# Patient Record
Sex: MALE | Race: WHITE
[De-identification: names, ages, dates, MRNs, and addresses within clinical notes are randomized per-mention and may not be internally consistent; named-entity substitution may affect disease eponyms.]

---

## 2018-02-08 ENCOUNTER — HOSPITAL ENCOUNTER (OUTPATIENT)
Dept: HOSPITAL 17 - HSDC | Age: 38
Discharge: HOME | End: 2018-02-08
Attending: SURGERY
Payer: COMMERCIAL

## 2018-02-08 VITALS
DIASTOLIC BLOOD PRESSURE: 94 MMHG | TEMPERATURE: 97.9 F | OXYGEN SATURATION: 97 % | RESPIRATION RATE: 18 BRPM | HEART RATE: 86 BPM | SYSTOLIC BLOOD PRESSURE: 145 MMHG

## 2018-02-08 VITALS — HEIGHT: 72 IN | WEIGHT: 208.78 LBS | BODY MASS INDEX: 28.28 KG/M2

## 2018-02-08 DIAGNOSIS — K44.9: Primary | ICD-10-CM

## 2018-02-08 DIAGNOSIS — K21.9: ICD-10-CM

## 2018-02-08 PROCEDURE — 00790 ANES IPER UPR ABD NOS: CPT

## 2018-02-08 PROCEDURE — 43281 LAP PARAESOPHAG HERN REPAIR: CPT

## 2018-02-08 NOTE — PD.OP
__________________________________________________





Operative Report


Date of Surgery:  Feb 8, 2018


Preoperative Diagnosis:  


hiatal hernia, GERD


Postoperative Diagnosis:  


same


Procedure:


lap repair HH with Nissen Fundoplication


Anesthesia:


general


Surgeon:


Americo Espinoza


Assistant(s):


Gavino Lopez, MS3


Operation and Findings:


3cm  hiatal hernia.


Esophageal diameter too large for biggest size Linx.


Fundoplication over 48 [place] bougie.


EBL less than 5 ml.











Americo Espinoza MD Feb 8, 2018 10:18

## 2018-02-10 NOTE — MP
cc:

HEIDI GERONIMO M.D., KETUL R. MD FREDETTE-HUFFMAN, PATRICIA

****

 

 

DATE OF SURGERY: 02/10/2018.

 

PREOPERATIVE DIAGNOSIS:

1. Hiatal hernia.

2. Gastroesophageal reflux disease.

 

POSTOPERATIVE DIAGNOSIS

1. Hiatal hernia.

2. Gastroesophageal reflux disease.

 

OPERATIVE PROCEDURE PERFORMED:

Laparoscopic repair hiatal hernia with Nissen fundoplication.

 

SURGEON:

Dr. Heidi Geronimo

 

ASSISTANT SURGEON:

Dr. Gavino Villalobos

 

SECOND ASSISTANT:

Cris Lopez, MS III.

 

ANESTHESIA:

General.

 

INDICATIONS FOR THE PROCEDURE:

Very pleasant 37-year-old gentleman who has suffered from severe

gastroesophageal reflux disease with esophagitis.  He has been on proton pump

inhibitors and would like to come off.  He is interested in placement of the

LINX device with repair of hiatal hernia.

 

INTRAOPERATIVE FINDINGS:

About a 3-cm hiatal hernia defect.  Esophageal diameter too large for the

biggest size LINX. Procedure converted from LINX to a Nissen fundoplication

which was performed over a 48 [place] Bougie.

 

ESTIMATED BLOOD LOSS:

Less than 5 mL.

 

DESCRIPTION OF THE PROCEDURE IN DETAIL:

The patient was identified as Heidi Pittman, taken to the operating room and

placed in the supine position.  Sequential compression devices were placed on

bilateral lower extremities.

Following induction of adequate general endotracheal anesthesia, the patient's

abdomen was prepped and draped in the usual sterile fashion with Chloraprep.  A

time-out procedure was performed.  Following completion of the time-out

procedure everyone's satisfaction within the room, local anesthetic was placed

at each incision site. An incision vertical about 3 cm in length was carried

out in the supraumbilical midline and dissection continued posteriorly to the

level of the midline fascia. The fascia was incised with a scalpel and entry

into the peritoneal cavity was facilitated with the surgeon's finger.  The

[employer] balloon Chinchilla trocar was placed in the peritoneal cavity its

balloon inflated to C02 insufflation to a level of 15 mmHg ensued.  Four upper

abdominal 5 mm trocars were placed into the peritoneal cavity under direct

laparoscopic view after incision in the skin with a scalpel.  The Tamara-Flex

liver retractor was placed beneath the left lateral lobe of the liver and held

anteriorly using the retractor with a robot arm. The skin was padded with a

laparotomy pad beneath the trocar with the Tamara Flex liver retractor.

 

Attention was turned first to taking down the gastrohepatic ligament. A window

was created using the harmonic scalpel. The harmonic scalpel allowed for

dissection of fatty tissue from the hiatal hernia defect reducing it down into

the peritoneal cavity.  Esophageal length was increased by releasing

attachments in the mediastinum to the esophagus to allow the GE junction to be

safely below the diaphragmatic crura. A posterior window was developed using

the Harmonic scalpel.  The posterior vagus nerve was identified and a window

between the posterior vagus nerve and the esophagus was created using a

[place] dissector.  The 1/4 inch Penrose drain was placed through this window.

The LINX sizing device was then placed through this window and at the largest

sized LINX measurement indentation into the esophagus occurred.  Further

mobilization of the esophagus was performed. The anterior vagus nerve was

identified and a window was created behind the anterior vagus nerve.  The

sizing device was then placed between the anterior and posterior vagus nerves

and the esophagus and sizing occurred again.  Again, the largest size LINX at

17 created an indentation in the esophagus making placement of a LINX

contraindicated.

 

The sizing device was removed and conversion of the procedure to a Nissen

fundoplication was determined to be the right thing to do for the patient.  The

fundus of the stomach was then mobilized dividing the short gastric vessels and

the fundus was brought through the posterior window above the gastroesophageal

junction.  Sequential placement of esophageal Bougies was then performed by the

nurse anesthetist placing a 36, 40, 44 and then 48 [place] Bougies.  Attempts to

place 50 and 52 [place] Bougies were unsuccessful as the nurse anesthetist could

not maneuver it from the mouth into the esophagus and therefore a 48 [place]

Bougie was replaced and left in position. A 360 degree fundoplication was then

performed using #0 Ethibond sutures and the suture assistant device and the

upper two sutures taking full thickness stomach on either side of partial

thickness anterior esophagus. The third suture placed was full thickness

stomach to full thickness stomach. The Bougie was then removed without

difficulty. A single suture was placed in the diaphragmatic crura performing a

modified gastropexy in this location taking the posterior fundus of the stomach

and suturing it to the diaphragmatic crura and closing the small hiatal hernia

defect.

 

Photographs were taken of the completed repair. Remaining local anesthetic was

placed in the subdiaphragmatic position.  The liver retractor was removed.

There was no evidence of injury to the liver.

 

Trocars were removed under direct visualization.  There was no evidence of

bleeding from the trocar sites.  The abdomen was actively desufflated through

the camera port which was then removed.  The fascial incision of the camera

port was closed with multiple interrupted inverted 0 Vicryl sutures.  Port

sites were irrigated with saline and skin incisions approximated with 4-0

Monocryl subcuticular sutures and dressed with Mastisol and half-inch brown

Steri-Strips.

 

The patient tolerated the procedure without apparent complication.

 

Sponge, needle and instrument counts were correct at the end of the case.

 

 

 

                              _________________________________

                              MD JOSTIN Powell/DIANA

D:  2/8/2018/10:19 AM

T:  2/10/2018/7:52 AM

Visit #:  [id number]

Job #:  [id number]